# Patient Record
Sex: MALE | Race: WHITE | NOT HISPANIC OR LATINO | Employment: OTHER | URBAN - METROPOLITAN AREA
[De-identification: names, ages, dates, MRNs, and addresses within clinical notes are randomized per-mention and may not be internally consistent; named-entity substitution may affect disease eponyms.]

---

## 2017-08-01 ENCOUNTER — HOSPITAL ENCOUNTER (OUTPATIENT)
Dept: GENERAL RADIOLOGY | Facility: HOSPITAL | Age: 57
Discharge: HOME | End: 2017-08-01
Admitting: FAMILY MEDICINE

## 2017-08-01 ENCOUNTER — TRANSCRIBE ORDERS (OUTPATIENT)
Dept: GENERAL RADIOLOGY | Facility: HOSPITAL | Age: 57
End: 2017-08-01

## 2017-08-01 DIAGNOSIS — M54.5 LOW BACK PAIN, UNSPECIFIED BACK PAIN LATERALITY, UNSPECIFIED CHRONICITY, WITH SCIATICA PRESENCE UNSPECIFIED: ICD-10-CM

## 2017-08-01 DIAGNOSIS — M54.5 LOW BACK PAIN, UNSPECIFIED BACK PAIN LATERALITY, UNSPECIFIED CHRONICITY, WITH SCIATICA PRESENCE UNSPECIFIED: Primary | ICD-10-CM

## 2017-08-01 PROCEDURE — 72220 X-RAY EXAM SACRUM TAILBONE: CPT

## 2017-11-14 ENCOUNTER — HOSPITAL ENCOUNTER (EMERGENCY)
Facility: HOSPITAL | Age: 57
Discharge: HOME | End: 2017-11-14
Attending: EMERGENCY MEDICINE | Admitting: EMERGENCY MEDICINE

## 2017-11-14 ENCOUNTER — APPOINTMENT (OUTPATIENT)
Dept: CT IMAGING | Facility: HOSPITAL | Age: 57
End: 2017-11-14

## 2017-11-14 VITALS
OXYGEN SATURATION: 98 % | HEART RATE: 82 BPM | WEIGHT: 225 LBS | TEMPERATURE: 98.8 F | RESPIRATION RATE: 16 BRPM | SYSTOLIC BLOOD PRESSURE: 110 MMHG | DIASTOLIC BLOOD PRESSURE: 62 MMHG | BODY MASS INDEX: 32.21 KG/M2 | HEIGHT: 70 IN

## 2017-11-14 DIAGNOSIS — S70.11XA CONTUSION OF RIGHT THIGH, INITIAL ENCOUNTER: Primary | ICD-10-CM

## 2017-11-14 PROCEDURE — 73700 CT LOWER EXTREMITY W/O DYE: CPT

## 2017-11-14 PROCEDURE — 99282 EMERGENCY DEPT VISIT SF MDM: CPT

## 2017-11-15 NOTE — ED PROVIDER NOTES
Subjective   HPI Comments: Pt is a 56-year-old white male that presents emergency Department with complaints of pain to his right posterior thigh.  Patient explains that he was roller skating on Friday.  Patient advises that he did the splits landing on his left knee and injuring his right posterior thigh.  Patient was able to get up and has been able to walk since.  Patient reports that today he noticed increasing bruising and swelling to the area.  Pt advises that he is able to walk without difficulty.  Patient complains of pain only when pressure is applied to his posterior thigh.    Patient is a 56 y.o. male presenting with lower extremity pain.   History provided by:  Patient  Lower Extremity Issue   Lower extremity pain location: rt lower extremity.  Time since incident:  5 days  Injury: yes    Mechanism of injury: fall    Fall:     Fall occurred: while rollerskating.    Entrapped after fall: no    Pain details:     Quality:  Throbbing  Dislocation: no    Prior injury to area:  No  Exacerbated by: pressure.  Associated symptoms: no back pain, no decreased ROM, no neck pain, no numbness, no swelling and no tingling        Review of Systems   Musculoskeletal: Negative for back pain and neck pain.        Rt thigh pain   Skin: Positive for color change (bruising to posterior rt thigh).   All other systems reviewed and are negative.      Past Medical History:   Diagnosis Date   • Cardiomyopathy        No Known Allergies    Past Surgical History:   Procedure Laterality Date   • KNEE MENISCAL REPAIR         History reviewed. No pertinent family history.    Social History     Social History   • Marital status:      Spouse name: N/A   • Number of children: N/A   • Years of education: N/A     Social History Main Topics   • Smoking status: Never Smoker   • Smokeless tobacco: Never Used   • Alcohol use Yes   • Drug use: No   • Sexual activity: Not Asked     Other Topics Concern   • None     Social History Narrative    • None           Objective   Physical Exam   Constitutional: He is oriented to person, place, and time. He appears well-developed and well-nourished. No distress.   HENT:   Head: Normocephalic and atraumatic.   Eyes: Conjunctivae and EOM are normal.   Neck: Normal range of motion.   Cardiovascular: Normal rate, regular rhythm, normal heart sounds and intact distal pulses.    Pulmonary/Chest: Effort normal and breath sounds normal.   Musculoskeletal: Normal range of motion. He exhibits tenderness (rt posterior thigh).   Ecchymosis to right posterior thigh, +ROM   Neurological: He is alert and oriented to person, place, and time.   Skin: Skin is warm and dry.   Psychiatric: He has a normal mood and affect. His behavior is normal.   Nursing note and vitals reviewed.      Procedures         ED Course  ED Course   Comment By Time   2150  patient is advised of results at this time.  Patient will be discharged home.  Patient to follow-up with primary care physician/ ortho. Patient agrees and verbalizes understanding. Johanna Infante, APRN 11/14 5939          No results found for this or any previous visit (from the past 24 hour(s)).  Note: In addition to lab results from this visit, the labs listed above may include labs taken at another facility or during a different encounter within the last 24 hours. Please correlate lab times with ED admission and discharge times for further clarification of the services performed during this visit.    CT Lower Extremity Right Without Contrast   Final Result     No acute fracture.        Contusion involving the soft tissues of the posterior thigh.        Edema involving the posterior thigh musculature.         _______________________________________________      EXAM:     CT Right Lower Extremity Without Intravenous Contrast, Knee      EXAM DATE/TIME:     11/14/2017 8:22 PM      CLINICAL HISTORY:     56 years old, male; Pain and injury or trauma and signs and symptoms; Fall;   "  Initial encounter; Other: Bruising; Sprain or strain; Thigh or upper leg;    Right; Additional info: Pain and bruising to posterior rt thigh, following a    skating accident      TECHNIQUE:     Axial computed tomography images of the right knee without intravenous    contrast.      COMPARISON:     No relevant prior studies available.      FINDINGS:     Bones/joints:  Mild degenerative change involving the knee.  No acute    fracture.  No dislocation.     Soft tissues:  Soft tissue infiltration involving the posterior soft tissues    likely representing contusion.  Edema involving the posterior thigh muscular    tear.      IMPRESSION:          No acute fracture.        Contusion involving the soft tissues of the posterior thigh.        Edema involving the posterior thigh musculature.      THIS DOCUMENT HAS BEEN ELECTRONICALLY SIGNED BY RYLIE SEWELL MD        Vitals:    11/14/17 1905 11/14/17 2154   BP: 107/58 110/62   BP Location: Left arm    Patient Position: Sitting    Pulse: 79 82   Resp: 18 16   Temp: 98.8 °F (37.1 °C)    TempSrc: Oral    SpO2: 94% 98%   Weight: 225 lb (102 kg)    Height: 70\" (177.8 cm)      Medications - No data to display  ECG/EMG Results (last 24 hours)     ** No results found for the last 24 hours. **                [facility]    Final diagnoses:   Contusion of right thigh, initial encounter            Johanna Infante, APRN  11/14/17 3775    "

## 2019-08-17 ENCOUNTER — HOSPITAL ENCOUNTER (EMERGENCY)
Facility: HOSPITAL | Age: 59
Discharge: HOME | End: 2019-08-17
Attending: EMERGENCY MEDICINE | Admitting: EMERGENCY MEDICINE

## 2019-08-17 VITALS
BODY MASS INDEX: 33.64 KG/M2 | OXYGEN SATURATION: 96 % | WEIGHT: 235 LBS | SYSTOLIC BLOOD PRESSURE: 125 MMHG | RESPIRATION RATE: 18 BRPM | DIASTOLIC BLOOD PRESSURE: 74 MMHG | HEART RATE: 83 BPM | HEIGHT: 70 IN | TEMPERATURE: 98.4 F

## 2019-08-17 DIAGNOSIS — S61.412A LACERATION OF LEFT HAND WITHOUT FOREIGN BODY, INITIAL ENCOUNTER: Primary | ICD-10-CM

## 2019-08-17 PROCEDURE — 90715 TDAP VACCINE 7 YRS/> IM: CPT | Performed by: NURSE PRACTITIONER

## 2019-08-17 PROCEDURE — 99283 EMERGENCY DEPT VISIT LOW MDM: CPT

## 2019-08-17 PROCEDURE — 25010000002 TDAP 5-2.5-18.5 LF-MCG/0.5 SUSPENSION: Performed by: NURSE PRACTITIONER

## 2019-08-17 PROCEDURE — 90471 IMMUNIZATION ADMIN: CPT | Performed by: NURSE PRACTITIONER

## 2019-08-17 RX ORDER — LIDOCAINE HYDROCHLORIDE 10 MG/ML
10 INJECTION, SOLUTION EPIDURAL; INFILTRATION; INTRACAUDAL; PERINEURAL ONCE
Status: COMPLETED | OUTPATIENT
Start: 2019-08-17 | End: 2019-08-17

## 2019-08-17 RX ADMIN — LIDOCAINE HYDROCHLORIDE 10 ML: 10 INJECTION, SOLUTION EPIDURAL; INFILTRATION; INTRACAUDAL; PERINEURAL at 18:23

## 2019-08-17 RX ADMIN — TETANUS TOXOID, REDUCED DIPHTHERIA TOXOID AND ACELLULAR PERTUSSIS VACCINE, ADSORBED 0.5 ML: 5; 2.5; 8; 8; 2.5 SUSPENSION INTRAMUSCULAR at 18:24

## 2019-08-17 NOTE — ED PROVIDER NOTES
Subjective   Evan Pyle is a 58 y.o. Male who presents to the ED c/o laceration. Patient reports at 1630 today he was taking a broken glass table top to a dumpster to dispose of. After getting a water bottle out of his cooler in his car, he zipped up the cooler and lacerated his finger on the broken glass table top. He states it was a clean cut with no broken glass lodged into his hand. Tetanus status is unknown. There are no other acute complaints at this time.        History provided by:  Patient  Laceration   Location:  Finger  Finger laceration location:  L index finger  Length:  2 cm  Bleeding: controlled with pressure    Time since incident:  1 hour  Laceration mechanism:  Broken glass  Pain details:     Severity:  Moderate  Relieved by:  Pressure  Worsened by:  Nothing  Tetanus status:  Unknown      Review of Systems   Skin:        Laceration on the left index finger.   All other systems reviewed and are negative.      Past Medical History:   Diagnosis Date   • Cardiomyopathy (CMS/HCC)        No Known Allergies    Past Surgical History:   Procedure Laterality Date   • KNEE MENISCAL REPAIR         No family history on file.    Social History     Socioeconomic History   • Marital status:      Spouse name: Not on file   • Number of children: Not on file   • Years of education: Not on file   • Highest education level: Not on file   Tobacco Use   • Smoking status: Never Smoker   • Smokeless tobacco: Never Used   Substance and Sexual Activity   • Alcohol use: Yes   • Drug use: No         Objective   Physical Exam   Constitutional: He is oriented to person, place, and time. He appears well-developed and well-nourished. No distress.   HENT:   Head: Normocephalic and atraumatic.   Eyes: Conjunctivae are normal. No scleral icterus.   Neck: Normal range of motion. Neck supple.   Cardiovascular: Normal rate, regular rhythm and normal heart sounds.   No murmur heard.  Pulmonary/Chest: Effort normal and breath  sounds normal. No respiratory distress.   Musculoskeletal: Normal range of motion. He exhibits no edema.   Neurological: He is alert and oriented to person, place, and time.   Skin: Skin is warm and dry.   Active bleeding controlled only with pressure.  Proximal and distal joints are negative.  There is a 2 cm laceration on the dorsal left hand.  Vascular exam is negative.   Psychiatric: He has a normal mood and affect. His behavior is normal.   Nursing note and vitals reviewed.      Laceration Repair  Date/Time: 8/17/2019 6:13 PM  Performed by: Clarisa Santana APRN  Authorized by: Wili Collado MD     Consent:     Consent obtained:  Verbal    Consent given by:  Patient    Risks discussed:  Infection, pain, poor cosmetic result and need for additional repair    Alternatives discussed:  No treatment, delayed treatment, observation and referral  Anesthesia (see MAR for exact dosages):     Anesthesia method:  Local infiltration    Local anesthetic:  Lidocaine 1% w/o epi  Laceration details:     Location:  Hand    Hand location:  L hand, dorsum    Length (cm):  2  Repair type:     Repair type:  Complex  Treatment:     Area cleansed with:  Betadine and saline    Amount of cleaning:  Standard    Irrigation solution:  Sterile saline    Irrigation method:  Syringe  Skin repair:     Repair method:  Sutures    Suture size:  5-0    Suture material:  Nylon    Suture technique:  Simple interrupted  Approximation:     Approximation:  Close    Vermilion border: well-aligned    Post-procedure details:     Dressing:  Sterile dressing    Patient tolerance of procedure:  Tolerated well, no immediate complications  Comments:      Hemostasis acquired.             ED Course     No results found for this or any previous visit (from the past 24 hour(s)).  Note: In addition to lab results from this visit, the labs listed above may include labs taken at another facility or during a different encounter within the last 24 hours. Please  "correlate lab times with ED admission and discharge times for further clarification of the services performed during this visit.    No orders to display     Vitals:    08/17/19 1707   BP: 125/74   BP Location: Right arm   Patient Position: Sitting   Pulse: 83   Resp: 18   Temp: 98.4 °F (36.9 °C)   TempSrc: Oral   SpO2: 96%   Weight: 107 kg (235 lb)   Height: 177.8 cm (70\")     Medications   Tdap (BOOSTRIX) injection 0.5 mL (0.5 mL Intramuscular Given 8/17/19 1824)   lidocaine PF 1% (XYLOCAINE) injection 10 mL (10 mL Infiltration Given 8/17/19 1823)     ECG/EMG Results (last 24 hours)     ** No results found for the last 24 hours. **        No orders to display                       MDM    Final diagnoses:   Laceration of left hand without foreign body, initial encounter       Documentation assistance provided by antione Cruz.  Information recorded by the scribe was done at my direction and has been verified and validated by me.     Leo Cruz  08/17/19 7304       Clarisa Santana APRN  08/17/19 2115    "

## 2019-08-17 NOTE — DISCHARGE INSTRUCTIONS
Strict skin care.  Do not submerge for first three days.  Follow up with your primary care provider to monitor your recovery and remove sutures on day 7.  Thank you

## 2025-03-07 DIAGNOSIS — E78.1 HYPERTRIGLYCERIDEMIA: Primary | ICD-10-CM

## 2025-03-07 RX ORDER — ICOSAPENT ETHYL 1000 MG/1
2 CAPSULE ORAL 2 TIMES DAILY WITH MEALS
Qty: 360 CAPSULE | Refills: 1 | Status: SHIPPED | OUTPATIENT
Start: 2025-03-07

## 2025-03-07 RX ORDER — ICOSAPENT ETHYL 1000 MG/1
2 CAPSULE ORAL 2 TIMES DAILY WITH MEALS
COMMUNITY
Start: 2025-03-03 | End: 2025-03-07 | Stop reason: SDUPTHER

## 2025-05-28 NOTE — PROGRESS NOTES
Cardiology Follow-Up Note     Name: Evan Pyle  :   1960  PCP: Armida Enciso, APRN  Date:   2025  Department: [facility] [place] [facility] CARDIOLOGY  [address]  [place], located within [facility] [address]  Fax [phone]  Phone [phone]    Chief Complaint   Patient presents with    Hyperlipidemia    Hypertension     Problem list:  CAD  Mixed ischemic/nonischemic cardiomyopathy  Cath 2006: EF 40%, normal coronary arteries  Cath 3/3/2023: Chronically occluded PDA with collaterals, mixed cardiomyopathy ischemic and nonischemic, EF 45%  Echo 2021: EF 50 to 55%, trace MR, mild TR  Echo 3/30/2020: EF 55 to 60%, trace MR, trace TR  Hypertension  Hyperlipidemia  1/15/2025: Total cholesterol 217, triglycerides 341,     Subjective     History of Present Illness  Evan Pyle is a 64 y.o. male who presents today for follow up.Doing well, no chest pain and shortness of breath.     Past Medical History:   Diagnosis Date    Anginal chest pain at rest     Cardiomyopathy     Hyperlipidemia     Hypertension     Prediabetes     Sleep apnea     Tricuspid regurgitation     mild by echo      Past Surgical History:   Procedure Laterality Date    CARDIAC CATHETERIZATION  2006    sjm o'oneill/ef 40%. normal coronaries. dilated cm,etiology unclear    CARDIAC CATHETERIZATION  2023    sje senait/iop:angina, abn stress study/ef 45%. cad with chronically occluded pda with collaterals. mild mixed cm, ischemic and non-ischemic    KNEE MENISCAL REPAIR         Current Outpatient Medications:     aspirin 81 MG EC tablet, Take 1 tablet by mouth Daily., Disp: , Rfl:     glucosamine-chondroitin 500-400 MG per tablet, Take 1 tablet by mouth Daily., Disp: , Rfl:     lisinopril (PRINIVIL,ZESTRIL) 5 MG tablet, Take 1 tablet by mouth Daily., Disp: , Rfl:     loratadine (CLARITIN) 10 MG tablet, Take 1 tablet by mouth Daily., Disp: , Rfl:     metoprolol succinate XL  "(TOPROL-XL) 100 MG 24 hr tablet, Take 1 tablet by mouth Daily., Disp: , Rfl:     Multiple Vitamins-Minerals (Bariatric Multivitamins) capsule, Take 1 tablet by mouth Daily., Disp: , Rfl:     rosuvastatin (CRESTOR) 20 MG tablet, Take 1 tablet by mouth Daily., Disp: , Rfl:     Vascepa 1 g capsule capsule, Take 2 g by mouth 2 (Two) Times a Day With Meals., Disp: 360 capsule, Rfl: 1    Objective     Vital Signs:  /64 (BP Location: Right arm, Patient Position: Sitting)   Pulse 82   Ht 177.8 cm (70\")   Wt 106 kg (234 lb 1.6 oz)   BMI 33.59 kg/m²   Estimated body mass index is 33.59 kg/m² as calculated from the following:    Height as of this encounter: 177.8 cm (70\").    Weight as of this encounter: 106 kg (234 lb 1.6 oz).             Cardiovascular:      PMI at left midclavicular line. Normal rate. Regular rhythm. Normal S1. Normal S2.       Murmurs: There is no murmur.      No gallop.  No click. No rub.   Pulses:     Intact distal pulses.   Edema:     Peripheral edema absent.           ECG 12 Lead    Date/Time: 6/2/2025 10:22 AM  Performed by: Roland Yin MD    Authorized by: Roland Yin MD  Comparison: not compared with previous ECG   Previous ECG: no previous ECG available  Rhythm: sinus rhythm  Other findings: T wave abnormality    Clinical impression: abnormal EKG          Data Review:   Lab Results   Component Value Date    BUN 18 07/28/2021    CREATININE 0.88 07/28/2021    EGFRIFNONA >60 07/28/2021    EGFRIFAFRI >60 07/28/2021    BCR 20 07/28/2021    K 5.0 (H) 07/28/2021    CO2 25 07/28/2021    CALCIUM 9.2 07/28/2021     Lab Results   Component Value Date    CHLPL 141 07/28/2021    TRIG 266 (H) 07/28/2021    HDL 30 (L) 07/28/2021    LDL 57.8 07/28/2021      Lab Results   Component Value Date    WBC 6.69 05/17/2023    RBC 4.11 (L) 05/17/2023    HGB 14.1 05/17/2023    HCT 40.4 05/17/2023    MCV 98 05/17/2023     05/17/2023     No results found for: \"TSH\"  Lab Results   Component Value Date " "   HGBA1C 5.7 (H) 10/04/2022     No results found for: \"INR\", \"PROTIME\"    Labs dated 4/28/2025.  LDL 68 triglyceride 113 HDL 45.  BMP normal CBC okay.  .7 MCH 33.7.    Assessment and Plan     Assessment & Plan  Coronary artery disease involving native coronary artery of native heart without angina pectoris  Coronary Artery Disease (OPTIONAL): Coronary artery disease is worsening.  Continue current treatment regimen. Dietary sodium restriction. Weight loss.  Cardiac status will be reassessed in 3 months.  Continue aspirin 81 mg once a day       Benign essential hypertension  Hypertension is stable and controlled  Continue current treatment regimen.  Dietary sodium restriction.  Weight loss.  Ambulatory blood pressure monitoring.  Blood pressure will be reassessed in 3 months.  Continue lisinopril 5 mg once a day and Toprol- mg once a day.       Hyperlipidemia LDL goal <70   Lipid abnormalities are stable    Plan:  Continue same medication/s without change.      Discussed medication dosage, use, side effects, and goals of treatment in detail.    Counseled patient on lifestyle modifications to help control hyperlipidemia.   Advised patient to exercise for 150 minutes weekly. (30 minute brisk walk, 5 days a week for example)    Patient Treatment Goals:   LDL goal is less than 70    Followup in 3 months.  Continue Crestor 10 mg once a day on Vascepa at 2 g twice a day.  The patient triglycerides and LDL both have improved.  Would recommend following up with family physician to check thiamine and folic acid level due to enlarged red blood cells.         Advised to continue current cardiac medications. Please notify of any issues. Discussed with the patient compliance with medical management and follow-up.     Follow Up  No follow-ups on file.    Call if you have any significant symptoms or go to the [facility] Emergency room if possible.     Roland Yin MD, FAC,[facility].  [facility] " [facility]    Part of this note may be an electronic transcription/translation of spoken language to printed text using the Dragon Dictation System.

## 2025-05-31 PROBLEM — I25.10 CORONARY ARTERY DISEASE INVOLVING NATIVE CORONARY ARTERY OF NATIVE HEART WITHOUT ANGINA PECTORIS: Status: ACTIVE | Noted: 2025-05-31

## 2025-05-31 PROBLEM — I10 BENIGN ESSENTIAL HYPERTENSION: Status: ACTIVE | Noted: 2025-05-31

## 2025-05-31 PROBLEM — E78.5 HYPERLIPIDEMIA LDL GOAL <70: Status: ACTIVE | Noted: 2025-05-31

## 2025-06-01 NOTE — ASSESSMENT & PLAN NOTE
Lipid abnormalities are stable    Plan:  Continue same medication/s without change.      Discussed medication dosage, use, side effects, and goals of treatment in detail.    Counseled patient on lifestyle modifications to help control hyperlipidemia.   Advised patient to exercise for 150 minutes weekly. (30 minute brisk walk, 5 days a week for example)    Patient Treatment Goals:   LDL goal is less than 70    Followup in 3 months.  Continue Crestor 10 mg once a day on Vascepa at 2 g twice a day.  The patient triglycerides and LDL both have improved.  Would recommend following up with family physician to check thiamine and folic acid level due to enlarged red blood cells.

## 2025-06-01 NOTE — ASSESSMENT & PLAN NOTE
Hypertension is stable and controlled  Continue current treatment regimen.  Dietary sodium restriction.  Weight loss.  Ambulatory blood pressure monitoring.  Blood pressure will be reassessed in 3 months.  Continue lisinopril 5 mg once a day and Toprol- mg once a day.

## 2025-06-01 NOTE — ASSESSMENT & PLAN NOTE
Coronary Artery Disease (OPTIONAL): Coronary artery disease is worsening.  Continue current treatment regimen. Dietary sodium restriction. Weight loss.  Cardiac status will be reassessed in 3 months.  Continue aspirin 81 mg once a day

## 2025-06-02 ENCOUNTER — OFFICE VISIT (OUTPATIENT)
Age: 65
End: 2025-06-02
Payer: MEDICARE

## 2025-06-02 VITALS
HEIGHT: 70 IN | SYSTOLIC BLOOD PRESSURE: 110 MMHG | BODY MASS INDEX: 33.52 KG/M2 | WEIGHT: 234.1 LBS | DIASTOLIC BLOOD PRESSURE: 64 MMHG | HEART RATE: 82 BPM

## 2025-06-02 DIAGNOSIS — E78.5 HYPERLIPIDEMIA LDL GOAL <70: ICD-10-CM

## 2025-06-02 DIAGNOSIS — I10 BENIGN ESSENTIAL HYPERTENSION: ICD-10-CM

## 2025-06-02 DIAGNOSIS — I25.10 CORONARY ARTERY DISEASE INVOLVING NATIVE CORONARY ARTERY OF NATIVE HEART WITHOUT ANGINA PECTORIS: Primary | ICD-10-CM

## 2025-06-02 PROCEDURE — 1160F RVW MEDS BY RX/DR IN RCRD: CPT | Performed by: INTERNAL MEDICINE

## 2025-06-02 PROCEDURE — 3078F DIAST BP <80 MM HG: CPT | Performed by: INTERNAL MEDICINE

## 2025-06-02 PROCEDURE — 3074F SYST BP LT 130 MM HG: CPT | Performed by: INTERNAL MEDICINE

## 2025-06-02 PROCEDURE — 93000 ELECTROCARDIOGRAM COMPLETE: CPT | Performed by: INTERNAL MEDICINE

## 2025-06-02 PROCEDURE — 1159F MED LIST DOCD IN RCRD: CPT | Performed by: INTERNAL MEDICINE

## 2025-06-02 PROCEDURE — 99214 OFFICE O/P EST MOD 30 MIN: CPT | Performed by: INTERNAL MEDICINE

## 2025-06-02 RX ORDER — LISINOPRIL 5 MG/1
5 TABLET ORAL DAILY
COMMUNITY

## 2025-06-02 RX ORDER — ASPIRIN 81 MG/1
81 TABLET ORAL DAILY
COMMUNITY

## 2025-06-02 RX ORDER — METOPROLOL SUCCINATE 100 MG/1
100 TABLET, EXTENDED RELEASE ORAL DAILY
COMMUNITY

## 2025-06-02 RX ORDER — ROSUVASTATIN CALCIUM 20 MG/1
1 TABLET, COATED ORAL DAILY
COMMUNITY
Start: 2025-05-07

## 2025-06-02 RX ORDER — RIBOFLAVIN (VITAMIN B2) 100 MG
1 TABLET ORAL DAILY
COMMUNITY

## 2025-06-02 RX ORDER — LORATADINE 10 MG/1
10 TABLET ORAL DAILY
COMMUNITY

## 2025-06-02 NOTE — Clinical Note
Lane Huffman, Mr. Evan Kirkland blood work shows enlarged red blood cells I advised him to follow-up with you for thiamine and folic acid levels.  He does drink but does not drink heavy.

## 2025-06-23 RX ORDER — RIBOFLAVIN (VITAMIN B2) 100 MG
1 TABLET ORAL DAILY
Qty: 90 TABLET | Refills: 1 | Status: SHIPPED | OUTPATIENT
Start: 2025-06-23

## 2025-06-23 NOTE — TELEPHONE ENCOUNTER
Rx Refill Note  Requested Prescriptions     Signed Prescriptions Disp Refills    glucosamine-chondroitin 500-400 MG per tablet 90 tablet 1     Sig: Take 1 tablet by mouth Daily.     Authorizing Provider: NORA HARDY     Ordering User: JOHN BALL      Last office visit with prescribing clinician: 6/2/2025   Last telemedicine visit with prescribing clinician: Visit date not found   Next office visit with prescribing clinician: 9/2/2025                        Pharmacy Info    Last Fill Date:  Rx Written Date:   Prescribed Qty:   Additional Details from Pharmacy:    Patient passed protocols per kya.         John Ball MA  06/23/25, 15:26 EDT

## 2025-08-06 RX ORDER — ROSUVASTATIN CALCIUM 20 MG/1
20 TABLET, COATED ORAL DAILY
Qty: 90 TABLET | Refills: 1 | Status: SHIPPED | OUTPATIENT
Start: 2025-08-06

## 2025-08-12 DIAGNOSIS — E78.1 HYPERTRIGLYCERIDEMIA: ICD-10-CM

## 2025-08-12 RX ORDER — ICOSAPENT ETHYL 1000 MG/1
2 CAPSULE ORAL 2 TIMES DAILY WITH MEALS
Qty: 360 CAPSULE | Refills: 1 | Status: SHIPPED | OUTPATIENT
Start: 2025-08-12